# Patient Record
Sex: MALE | Race: WHITE | Employment: FULL TIME | URBAN - METROPOLITAN AREA
[De-identification: names, ages, dates, MRNs, and addresses within clinical notes are randomized per-mention and may not be internally consistent; named-entity substitution may affect disease eponyms.]

---

## 2024-01-19 ENCOUNTER — HOSPITAL ENCOUNTER (EMERGENCY)
Age: 54
Discharge: HOME | End: 2024-01-19
Attending: EMERGENCY MEDICINE
Payer: COMMERCIAL

## 2024-01-19 ENCOUNTER — APPOINTMENT (OUTPATIENT)
Dept: GENERAL RADIOLOGY | Age: 54
End: 2024-01-19
Payer: COMMERCIAL

## 2024-01-19 VITALS
RESPIRATION RATE: 18 BRPM | SYSTOLIC BLOOD PRESSURE: 185 MMHG | OXYGEN SATURATION: 100 % | BODY MASS INDEX: 30.56 KG/M2 | HEIGHT: 74 IN | WEIGHT: 238.1 LBS | TEMPERATURE: 97.6 F | DIASTOLIC BLOOD PRESSURE: 98 MMHG | HEART RATE: 97 BPM

## 2024-01-19 DIAGNOSIS — S86.812A PATELLAR TENDON RUPTURE, LEFT, INITIAL ENCOUNTER: Primary | ICD-10-CM

## 2024-01-19 PROCEDURE — 99283 EMERGENCY DEPT VISIT LOW MDM: CPT

## 2024-01-19 PROCEDURE — 73560 X-RAY EXAM OF KNEE 1 OR 2: CPT

## 2024-01-19 ASSESSMENT — PAIN DESCRIPTION - ONSET: ONSET: ON-GOING

## 2024-01-19 ASSESSMENT — PAIN - FUNCTIONAL ASSESSMENT
PAIN_FUNCTIONAL_ASSESSMENT: ACTIVITIES ARE NOT PREVENTED
PAIN_FUNCTIONAL_ASSESSMENT: 0-10

## 2024-01-19 ASSESSMENT — PAIN DESCRIPTION - DESCRIPTORS: DESCRIPTORS: PRESSURE

## 2024-01-19 ASSESSMENT — PAIN DESCRIPTION - ORIENTATION: ORIENTATION: LEFT

## 2024-01-19 ASSESSMENT — PAIN SCALES - GENERAL: PAINLEVEL_OUTOF10: 3

## 2024-01-19 ASSESSMENT — PAIN DESCRIPTION - FREQUENCY: FREQUENCY: CONTINUOUS

## 2024-01-19 ASSESSMENT — PAIN DESCRIPTION - PAIN TYPE: TYPE: ACUTE PAIN

## 2024-01-19 ASSESSMENT — PAIN DESCRIPTION - LOCATION: LOCATION: KNEE

## 2024-01-19 NOTE — ED PROVIDER NOTES
EMERGENCY DEPARTMENT ENCOUNTER     [facility] EMERGENCY DEPARTMENT     Pt Name: Timi Nguyen Jr.   MRN: [id number]   Birthdate 1970   Date of evaluation: 1/19/2024   Provider: Flavio Montiel MD   PCP: No primary care provider on file.   Note Started: 5:44 PM EST 1/19/24     CHIEF COMPLAINT     Chief Complaint   Patient presents with    Fall     Mechanical left knee no LOC         HISTORY OF PRESENT ILLNESS:  History from : Patient   Limitations to history : None     Timi Nguyen Jr. is a 53 y.o. male who presents for left knee pain.  Patient reports that he slipped and injured his left knee.  This occurred as a part of his work activities.  He thought his kneecap dislocated.  He denies any other complaints or injuries.  The pain is moderate but he does not request any pain medication.    Nursing Notes were all reviewed and agreed with or any disagreements were addressed in the HPI.     ROS: Positives and Pertinent negatives as per HPI.    PAST MEDICAL HISTORY     Past medical history:  has no past medical history on file.    Past surgical history:  has no past surgical history on file.      PHYSICAL EXAM:  ED Triage Vitals [01/19/24 1046]   BP Temp Temp Source Pulse Respirations SpO2 Height Weight - Scale   (!) 191/95 97.6 °F (36.4 °C) Oral 97 18 100 % 1.88 m (6' 2\") 108 kg (238 lb 1.6 oz)        Physical Exam   On exam the patella is high riding on the left.  Patient's extensor mechanism is entirely out on the left knee.  No other tenderness or injuries of the left lower extremity.  Neurovascular intact to the toes.    DIAGNOSTIC RESULTS       RADIOLOGY:      Interpretation per the Radiologist below, if available at the time of this note:    XR KNEE LEFT (1-2 VIEWS)   Final Result   1.  Patella real consistent with history of patellar tendon injury.   2.  Cortical irregularity of the lateral tibial spine may indicate a small   avulsion fracture. Correlate with point tenderness and